# Patient Record
Sex: MALE | Race: ASIAN | URBAN - METROPOLITAN AREA
[De-identification: names, ages, dates, MRNs, and addresses within clinical notes are randomized per-mention and may not be internally consistent; named-entity substitution may affect disease eponyms.]

---

## 2023-06-14 ENCOUNTER — EMERGENCY (EMERGENCY)
Facility: HOSPITAL | Age: 48
LOS: 1 days | Discharge: HOME | End: 2023-06-14
Admitting: EMERGENCY MEDICINE
Payer: MEDICAID

## 2023-06-14 VITALS
TEMPERATURE: 98 F | RESPIRATION RATE: 17 BRPM | SYSTOLIC BLOOD PRESSURE: 125 MMHG | DIASTOLIC BLOOD PRESSURE: 77 MMHG | HEART RATE: 92 BPM | OXYGEN SATURATION: 98 %

## 2023-06-14 PROCEDURE — 73630 X-RAY EXAM OF FOOT: CPT | Mod: 26,LT

## 2023-06-14 PROCEDURE — 73610 X-RAY EXAM OF ANKLE: CPT | Mod: 26,LT

## 2023-06-14 PROCEDURE — 93971 EXTREMITY STUDY: CPT | Mod: 26,LT

## 2023-06-14 PROCEDURE — 93010 ELECTROCARDIOGRAM REPORT: CPT

## 2023-06-14 PROCEDURE — 99284 EMERGENCY DEPT VISIT MOD MDM: CPT

## 2023-06-14 NOTE — ED PROVIDER NOTE - NSFOLLOWUPINSTRUCTIONS_ED_ALL_ED_FT
Rest, drink plenty of fluids.  Advance activity as tolerated.  Continue all previously prescribed medications as directed.  Follow up with your primary care physician in 48-72 hours- bring copies of your results.  Return to the ER for worsening or persistent symptoms, and/or ANY NEW OR CONCERNING SYMPTOMS. If you have issues obtaining follow up, please call: [phone] to obtain a doctor or specialist who takes your insurance in your area.  You may call [phone] to make an appointment with the internal medicine clinic.

## 2023-06-14 NOTE — ED PROVIDER NOTE - PROGRESS NOTE DETAILS
KATTY Temple: patient xray does not show any acute fracture, however does show navicularis, will recommend to follow up with the podiatrist and to return if symptoms worsen or persist.

## 2023-06-14 NOTE — ED PROVIDER NOTE - LOCATION
left foot swelling noted, no edema or warmth, no tenderness noted. full ROM of the foot, no obvious trauma, no deformities./foot

## 2023-06-14 NOTE — ED PROVIDER NOTE - OBJECTIVE STATEMENT
This is a 47-year-old male with no past medical history presented to the  complaining of having left foot pain and swelling.  States that its been on for the last few days he is unsure if there is any other trauma to the foot however states that he has been having some discomfort.  He denies having any numbness or tingling in the foot contrary to triage note he denies having any right foot swelling as well.  He denies having any calf pain or discomfort.  He denies having any shortness of breath exertional dyspnea he denies having any dysuria hematuria urinary urgency frequency denies any shortness of breath orthopnea or signs of CHF.

## 2023-06-14 NOTE — ED PROVIDER NOTE - CLINICAL SUMMARY MEDICAL DECISION MAKING FREE TEXT BOX
This is a 47-year-old male with no past medical history presented to the  complaining of having left foot pain and swelling.  States that its been on for the last few days he is unsure if there is any other trauma to the foot however states that he has been having some discomfort. foot pain and swelling-xray, ultrasound reassess

## 2023-06-14 NOTE — ED ADULT NURSE NOTE - OBJECTIVE STATEMENT
Patient received in wellness, exam room 2. Patient A&Ox3 and ambulatory at baseline. Patient presents to the ED c/o left foot pain. Patient denies significant phx. Patient states he has been experiencing left foot pain and swelling for approximately four days. Pulse/motor/sensory present to bilateral lower extremities, upon palpation patient endorses pain to the top of left foot. Swelling noted to left foot, no redness/warmth noted to left foot. Patient denies recent trauma and no ecchymosis/abrasions/hematoma noted. Patient no complaints at this time. Respirations even and unlabored, no signs/symptoms of acute distress; patient denies dyspnea, shortness of breath, and chest pain. Patient is stable at this time. Steady gait observed.

## 2023-06-14 NOTE — ED PROVIDER NOTE - PATIENT PORTAL LINK FT
You can access the FollowMyHealth Patient Portal offered by [facility] by registering at the following website: [email or web address]. By joining [employer]’s FollowMyHealth portal, you will also be able to view your health information using other applications (apps) compatible with our system.

## 2023-09-29 PROBLEM — Z00.00 ENCOUNTER FOR PREVENTIVE HEALTH EXAMINATION: Status: ACTIVE | Noted: 2023-09-29

## 2023-10-21 ENCOUNTER — APPOINTMENT (OUTPATIENT)
Dept: MRI IMAGING | Facility: IMAGING CENTER | Age: 48
End: 2023-10-21

## 2023-11-18 ENCOUNTER — APPOINTMENT (OUTPATIENT)
Dept: MRI IMAGING | Facility: IMAGING CENTER | Age: 48
End: 2023-11-18
Payer: MEDICAID

## 2023-11-18 ENCOUNTER — RESULT REVIEW (OUTPATIENT)
Age: 48
End: 2023-11-18

## 2023-11-18 ENCOUNTER — OUTPATIENT (OUTPATIENT)
Dept: OUTPATIENT SERVICES | Facility: HOSPITAL | Age: 48
LOS: 1 days | End: 2023-11-18
Payer: COMMERCIAL

## 2023-11-18 DIAGNOSIS — Z00.8 ENCOUNTER FOR OTHER GENERAL EXAMINATION: ICD-10-CM

## 2023-11-18 PROCEDURE — 73718 MRI LOWER EXTREMITY W/O DYE: CPT | Mod: 26,LT

## 2023-11-18 PROCEDURE — 73718 MRI LOWER EXTREMITY W/O DYE: CPT
